# Patient Record
(demographics unavailable — no encounter records)

---

## 2020-06-16 NOTE — REPVR
PROCEDURE INFORMATION: 

Exam: CT Head Without Contrast 

Exam date and time: 6/16/2020 8:49 PM 

Age: 3 years old 

Clinical indication: Injury or trauma; Fall; Initial encounter; Blunt trauma 

(contusions or hematomas); Additional info: Fall from off of trampoline with 

headache/fatigue 



TECHNIQUE: 

Imaging protocol: Computed tomography of the head without contrast. 

Radiation optimization: All CT scans at this facility use at least one of these 

dose optimization techniques: automated exposure control; mA and/or kV 

adjustment per patient size (includes targeted exams where dose is matched to 

clinical indication); or iterative reconstruction. 



COMPARISON: 

No relevant prior studies available. 



FINDINGS: 

Brain: Unremarkable. No hemorrhage. Unremarkable white matter. No mass effect. 

Ventricles: Normal. No ventriculomegaly. 

Bones/joints: There is a linear minimally displaced fracture of the right 

occipital bone. No other fractures are seen. 

Sinuses: Visualized sinuses are unremarkable. No fluid levels. 

Mastoid air cells: Visualized mastoid air cells are well aerated. 



Soft tissues: Unremarkable. 



IMPRESSION: 

1. Minimally displaced right occipital bone fracture. 

2. No acute intracranial abnormality. 



Electronically signed by: John Paul Jennings On 06/16/2020  20:58:25 PM